# Patient Record
Sex: FEMALE | Race: WHITE | NOT HISPANIC OR LATINO | URBAN - METROPOLITAN AREA
[De-identification: names, ages, dates, MRNs, and addresses within clinical notes are randomized per-mention and may not be internally consistent; named-entity substitution may affect disease eponyms.]

---

## 2017-03-03 ENCOUNTER — ALLSCRIPTS OFFICE VISIT (OUTPATIENT)
Dept: OTHER | Facility: OTHER | Age: 52
End: 2017-03-03

## 2017-03-27 ENCOUNTER — TRANSCRIBE ORDERS (OUTPATIENT)
Dept: ADMINISTRATIVE | Facility: HOSPITAL | Age: 52
End: 2017-03-27

## 2017-03-27 ENCOUNTER — LAB CONVERSION - ENCOUNTER (OUTPATIENT)
Dept: OTHER | Facility: OTHER | Age: 52
End: 2017-03-27

## 2017-03-27 DIAGNOSIS — E04.1 NONTOXIC UNINODULAR GOITER: Primary | ICD-10-CM

## 2017-03-27 LAB
LYME 18 KD IGG (HISTORICAL): REACTIVE
LYME 23 KD IGG (HISTORICAL): REACTIVE
LYME 23 KD IGM (HISTORICAL): ABNORMAL
LYME 28 KD IGG (HISTORICAL): ABNORMAL
LYME 30 KD IGG (HISTORICAL): ABNORMAL
LYME 39 KD IGG (HISTORICAL): ABNORMAL
LYME 39 KD IGM (HISTORICAL): ABNORMAL
LYME 41 KD IGG (HISTORICAL): REACTIVE
LYME 41 KD IGM (HISTORICAL): ABNORMAL
LYME 45 KD IGG (HISTORICAL): REACTIVE
LYME 58 KD IGG (HISTORICAL): REACTIVE
LYME 66 KD IGG (HISTORICAL): ABNORMAL
LYME 93 KD IGG (HISTORICAL): ABNORMAL
LYME IGG (HISTORICAL): POSITIVE
LYME IGM (HISTORICAL): NEGATIVE

## 2017-03-28 ENCOUNTER — GENERIC CONVERSION - ENCOUNTER (OUTPATIENT)
Dept: OTHER | Facility: OTHER | Age: 52
End: 2017-03-28

## 2017-04-04 ENCOUNTER — HOSPITAL ENCOUNTER (OUTPATIENT)
Dept: ULTRASOUND IMAGING | Facility: CLINIC | Age: 52
Discharge: HOME | End: 2017-04-04
Payer: COMMERCIAL

## 2017-04-04 DIAGNOSIS — E04.1 NONTOXIC UNINODULAR GOITER: ICD-10-CM

## 2017-04-04 PROCEDURE — 76536 US EXAM OF HEAD AND NECK: CPT

## 2017-04-06 ENCOUNTER — GENERIC CONVERSION - ENCOUNTER (OUTPATIENT)
Dept: OTHER | Facility: OTHER | Age: 52
End: 2017-04-06

## 2017-05-10 ENCOUNTER — ALLSCRIPTS OFFICE VISIT (OUTPATIENT)
Dept: OTHER | Facility: OTHER | Age: 52
End: 2017-05-10

## 2017-05-11 ENCOUNTER — LAB CONVERSION - ENCOUNTER (OUTPATIENT)
Dept: OTHER | Facility: OTHER | Age: 52
End: 2017-05-11

## 2017-05-11 LAB
LYME 18 KD IGG (HISTORICAL): REACTIVE
LYME 23 KD IGG (HISTORICAL): REACTIVE
LYME 23 KD IGM (HISTORICAL): ABNORMAL
LYME 28 KD IGG (HISTORICAL): ABNORMAL
LYME 30 KD IGG (HISTORICAL): ABNORMAL
LYME 39 KD IGG (HISTORICAL): REACTIVE
LYME 39 KD IGM (HISTORICAL): ABNORMAL
LYME 41 KD IGG (HISTORICAL): REACTIVE
LYME 41 KD IGM (HISTORICAL): ABNORMAL
LYME 45 KD IGG (HISTORICAL): REACTIVE
LYME 58 KD IGG (HISTORICAL): REACTIVE
LYME 66 KD IGG (HISTORICAL): ABNORMAL
LYME 93 KD IGG (HISTORICAL): ABNORMAL
LYME IGG (HISTORICAL): POSITIVE
LYME IGG/IGM AB (HISTORICAL): 10.4 INDEX
LYME IGM (HISTORICAL): NEGATIVE

## 2017-06-08 ENCOUNTER — GENERIC CONVERSION - ENCOUNTER (OUTPATIENT)
Dept: OTHER | Facility: OTHER | Age: 52
End: 2017-06-08

## 2017-06-12 ENCOUNTER — GENERIC CONVERSION - ENCOUNTER (OUTPATIENT)
Dept: OTHER | Facility: OTHER | Age: 52
End: 2017-06-12

## 2017-06-12 ENCOUNTER — LAB CONVERSION - ENCOUNTER (OUTPATIENT)
Dept: OTHER | Facility: OTHER | Age: 52
End: 2017-06-12

## 2017-06-12 LAB
LYME 18 KD IGG (HISTORICAL): REACTIVE
LYME 23 KD IGG (HISTORICAL): REACTIVE
LYME 23 KD IGM (HISTORICAL): ABNORMAL
LYME 28 KD IGG (HISTORICAL): ABNORMAL
LYME 30 KD IGG (HISTORICAL): ABNORMAL
LYME 39 KD IGG (HISTORICAL): REACTIVE
LYME 39 KD IGM (HISTORICAL): ABNORMAL
LYME 41 KD IGG (HISTORICAL): REACTIVE
LYME 41 KD IGM (HISTORICAL): ABNORMAL
LYME 45 KD IGG (HISTORICAL): REACTIVE
LYME 58 KD IGG (HISTORICAL): REACTIVE
LYME 66 KD IGG (HISTORICAL): ABNORMAL
LYME 93 KD IGG (HISTORICAL): ABNORMAL
LYME IGG (HISTORICAL): POSITIVE
LYME IGG/IGM AB (HISTORICAL): 11 INDEX
LYME IGM (HISTORICAL): NEGATIVE

## 2017-06-13 ENCOUNTER — GENERIC CONVERSION - ENCOUNTER (OUTPATIENT)
Dept: OTHER | Facility: OTHER | Age: 52
End: 2017-06-13

## 2017-12-08 PROCEDURE — 87624 HPV HI-RISK TYP POOLED RSLT: CPT | Performed by: OBSTETRICS & GYNECOLOGY

## 2017-12-08 PROCEDURE — G0145 SCR C/V CYTO,THINLAYER,RESCR: HCPCS | Performed by: OBSTETRICS & GYNECOLOGY

## 2017-12-12 ENCOUNTER — LAB REQUISITION (OUTPATIENT)
Dept: LAB | Facility: HOSPITAL | Age: 52
End: 2017-12-12
Payer: COMMERCIAL

## 2017-12-12 DIAGNOSIS — Z01.419 ENCOUNTER FOR GYNECOLOGICAL EXAMINATION WITHOUT ABNORMAL FINDING: ICD-10-CM

## 2017-12-14 LAB
HPV RRNA GENITAL QL NAA+PROBE: NORMAL
LAB AP GYN PRIMARY INTERPRETATION: NORMAL
Lab: NORMAL

## 2017-12-27 DIAGNOSIS — M25.562 PAIN IN LEFT KNEE: ICD-10-CM

## 2017-12-28 ENCOUNTER — GENERIC CONVERSION - ENCOUNTER (OUTPATIENT)
Dept: OTHER | Facility: OTHER | Age: 52
End: 2017-12-28

## 2017-12-28 ENCOUNTER — TRANSCRIBE ORDERS (OUTPATIENT)
Dept: ADMINISTRATIVE | Facility: HOSPITAL | Age: 52
End: 2017-12-28

## 2017-12-28 ENCOUNTER — APPOINTMENT (OUTPATIENT)
Dept: RADIOLOGY | Facility: CLINIC | Age: 52
End: 2017-12-28
Payer: COMMERCIAL

## 2017-12-28 DIAGNOSIS — M25.562 PAIN IN LEFT KNEE: ICD-10-CM

## 2017-12-28 DIAGNOSIS — M25.461 SWELLING OF RIGHT KNEE JOINT: Primary | ICD-10-CM

## 2017-12-28 DIAGNOSIS — M17.5 OTHER SECONDARY OSTEOARTHRITIS OF LEFT KNEE: ICD-10-CM

## 2017-12-28 PROCEDURE — 73564 X-RAY EXAM KNEE 4 OR MORE: CPT

## 2018-01-05 ENCOUNTER — GENERIC CONVERSION - ENCOUNTER (OUTPATIENT)
Dept: OTHER | Facility: OTHER | Age: 53
End: 2018-01-05

## 2018-01-05 ENCOUNTER — HOSPITAL ENCOUNTER (OUTPATIENT)
Dept: MRI IMAGING | Facility: HOSPITAL | Age: 53
Discharge: HOME | End: 2018-01-05
Attending: ORTHOPAEDIC SURGERY
Payer: COMMERCIAL

## 2018-01-05 DIAGNOSIS — M17.5 OTHER SECONDARY OSTEOARTHRITIS OF LEFT KNEE: ICD-10-CM

## 2018-01-05 DIAGNOSIS — M25.461 SWELLING OF RIGHT KNEE JOINT: ICD-10-CM

## 2018-01-05 PROCEDURE — 73721 MRI JNT OF LWR EXTRE W/O DYE: CPT

## 2018-01-09 NOTE — MISCELLANEOUS
Message   Recorded as Task   Date: 09/14/2016 10:24 AM, Created By: Arsenio Ewing   Task Name: Care Coordination   Assigned To: Liv Ortiz   Regarding Patient: Sánchez Disla, Status: In Progress   Comment:    Marisabel Yeager - 14 Sep 2016 10:24 AM     TASK CREATED  pt is on Menest 0 625 and progesterone 100 mg for hot flashes    she is not noticing a difference    she was talking with a pharmacist that was talking about bioidenticals and that [address] [place] [address] has a pharmacy that compounds these    she is interested    Help? Garcia Fragmin - 15 Sep 2016 12:20 PM     TASK REPLIED TO: Previously Assigned To Antonella Hudsonellaadolfo is actually an FDA approved bioidentical hormone, I think she should give it a few more weeks before trying something else, it has not been that long   Marisabel Yeager - 15 Sep 2016 2:44 PM     TASK REASSIGNED: Previously Assigned To Renuka Chase - 16 Sep 2016 9:40 AM     TASK REASSIGNED: Previously Assigned To Liv Ortiz   pt is concerned about the progesterone not being bioindentical and she is worried about the warnings about the sun exposure    she is outside for work    I encouraged her to keep trying this, but       Garcia Fragmin - 18 Sep 2016 8:36 PM     TASK REPLIED TO: Previously Assigned To Garcia Fragmin  she can use provera 2 5mg per day   Marisabel Yeager - 19 Sep 2016 8:07 AM     TASK REPLIED TO: Previously Assigned To MARTIN FREIRE MAX [facility] Nursing,Team  provera is biodentical?  she will ask me   Garcia Fragmin - 20 Sep 2016 10:46 PM     TASK REPLIED TO: Previously Assigned To Garcia Fragmin  no but will give her good control with the bioidentical estrogen   Marisabel Yeager - 21 Sep 2016 8:26 AM     TASK IN PROGRESS   Marisabel Yeager - 21 Sep 2016 8:27 AM     TASK REPLIED TO: Previously Assigned To [address] is the sun exposure   Garcia Fragmin - 22 Sep 2016 1:18 PM     TASK REPLIED TO: Previously Assigned To Garcia Fragmin  she should use the climara patch and the prometrium, it is the best combo and climara is bioidentical   Marisabel Yeager - 23 Sep 2016 12:59 PM     TASK REPLIED TO: Previously Assigned To MARTIN FREIRE MAX [facility] Nursing,Team  Is there any way that you can call her    everytime I call her she challenges me to the core and I feel that we aren't getting anywhere  Christina Aparicio - 23 Sep 2016 2:58 PM     TASK REPLIED TO: Previously Assigned To Clare Michael  I will call her Monday        Active Problems    1  Bloating (787 3) (R14 0)   2  Chronic fatigue (780 79) (R53 82)   3  Encounter for screening mammogram for breast cancer (V76 12) (Z12 31)   4  Flu-like symptoms (780 99) (R68 89)   5  Denied: History of self breast exam   6  Hot flashes, menopausal (627 2) (N95 1)   7  Hyperlipidemia (272 4) (E78 5)   8  Lyme disease (088 81) (A69 20)   9  Pain of right sacroiliac joint (724 6) (M53 3)   10  Pain with urination (788 1) (R30 9)   11  Perimenopause (627 2) (N95 1)   12  Strain of lumbar region, initial encounter (847 2) (S39 012A)   13  Thyroid Nodule   14  Urinary frequency (788 41) (R35 0)   15  Viral infection (079 99) (B34 9)   16  Visit for gynecologic examination (V72 31) (Z01 419)   17  Visit for screening mammogram (V76 12) (Z12 31)   18  Vitamin D deficiency (268 9) (E55 9)    Current Meds   1  Menest 0 625 MG Oral Tablet; TAKE 1 TABLET DAILY; Therapy: 17Vwh4708 to (Cinda Euler)  Requested for: 18Ksl4372; Last   Rx:[id number] Ordered   2  Progesterone Micronized 100 MG Oral Capsule; One daily at bedtime;    Therapy: 15Seo5849 to (Cinda Euler)  Requested for: 79Mcq5501; Last   Rx:25Yur3612 Ordered    Allergies    1  doxycycline    Signatures   Electronically signed by : Tonna Ormond, ; Sep 26 2016  9:14AM EST                       (Author)

## 2018-01-10 ENCOUNTER — GENERIC CONVERSION - ENCOUNTER (OUTPATIENT)
Dept: OTHER | Facility: OTHER | Age: 53
End: 2018-01-10

## 2018-01-10 NOTE — MISCELLANEOUS
Message   Recorded as Task   Date: 06/17/2016 06:24 PM, Created By: Anthony Lara   Task Name: Follow Up   Assigned To: Anthony Lara   Regarding Patient: Juan Pablo Vela, Status: Active   Comment:    Anthony Lara - 17 Jun 2016 6:24 PM     TASK CREATED  Call the patient about her thyroid ultrasound  Yasemin Lau - 22 Jun 2016 6:22 PM     TASK EDITED  I called the patient and reviewed her thyroid ultrasound with her  It demonstrates a small subcentimeter thyroid nodule on the left lobe of the thyroid gland  This is increased very slightly since her last scan in 2011  I advised her that we will plan on repeat repeating a thyroid ultrasound on her again in 6 months  Otherwise, she'll follow-up as scheduled  Results/Data  Results    US THYROID   US THYROID: THYROID ULTRASOUND     INDICATION: Follow-up nodule     COMPARISON: 12/20/2011     TECHNIQUE:   Ultrasound of the thyroid was performed with a high frequency linear  transducer in transverse and sagittal planes including volumetric imaging sweeps as well  as traditional still imaging technique  FINDINGS:   Normal homogeneous smooth echotexture  Right gland:  1 5 x 1 6 x 5 2 cm  No nodules  Left gland:  1 4 x 1 6 x 4 8 cm  Left midpole  0 7 x 0 7 x 0 9 cm  Solid isoechoic nodule  Smooth, well defined margins  No calcifications  Nodule is not taller than it is wide  This nodule has mildly increased in  size from the prior study where it measured 0 3 x 0 5 x 0 5 cm and    appeared more hypoechoic  Isthmus: 0 3 cm in AP dimension  No nodules  IMPRESSION:   There is a left thyroid nodule  While the nodule has mildly increased in size from the  prior study, it remains subcentimeter and without suspicious features, more hyperechoic  than on the previous exam   This does not meet current American Thyroid     Association criteria for requiring biopsy  A shorter interval follow-up is recommended in  12 months       Reference: 2015 American Thyroid Association Management Guidelines for Adult  Patients with Thyroid Nodules and Differentiated Thyroid Cancer  Thyroid, Volume 26,  Number 1, 2016       ##fuslh12##fuslh12       Workstation performed: [id number]     Signed by:   Eusebia Chou MD   6/17/16     Signatures   Electronically signed by : Virginia Josue DO; Jun 22 2016  6:23PM EST                       (Author)

## 2018-01-11 NOTE — MISCELLANEOUS
Message   Recorded as Task   Date: 08/26/2016 01:35 PM, Created By: Martina Keating   Task Name: Medical Complaint Callback   Assigned To: Camryn William   Regarding Patient: Hayden Ewing, Status: Active   CommentCarlos Jaeadalgisa - 26 Aug 2016 1:35 PM     TASK CREATED  Patient called as a follow up to her appointment in [place]  States her menopause sx are much worse  C/o daily hot flashes, insomnia, vaginal dryness  Wants HRT Rx  Particularly interested in vaginal cream, bio identicles  Questions if this can be handled over the phone or if she needs another appointment  Jessica Naval - 29 Aug 2016 2:08 PM     TASK REPLIED TO: Previously Assigned To Jessica Naval  she should come in   Pt informed  Will schedule  Active Problems    1  Bloating (787 3) (R14 0)   2  Chronic fatigue (780 79) (R53 82)   3  Encounter for screening mammogram for breast cancer (V76 12) (Z12 31)   4  Flu-like symptoms (780 99) (R68 89)   5  Denied: History of self breast exam   6  Hyperlipidemia (272 4) (E78 5)   7  Lyme disease (088 81) (A69 20)   8  Pain of right sacroiliac joint (724 6) (M53 3)   9  Pain with urination (788 1) (R30 9)   10  Perimenopause (627 2) (N95 1)   11  Strain of lumbar region, initial encounter (847 2) (S39 012A)   12  Thyroid Nodule   13  Urinary frequency (788 41) (R35 0)   14  Viral infection (079 99) (B34 9)   15  Visit for gynecologic examination (V72 31) (Z01 419)   16  Visit for screening mammogram (V76 12) (Z12 31)   17  Vitamin D deficiency (268 9) (E55 9)    Current Meds   1  Amoxicillin 500 MG Oral Tablet; Take 1 tablet 3 times daily for 10 days, with food;    Therapy: 59RVG1429 to (Evaluate:31Hhp3635)  Requested for: 80Dhs3641; Last   Rx:36Ogp1295 Ordered    Allergies    1  doxycycline    Signatures   Electronically signed by : Gregory Dumont, ; Aug 29 2016  2:35PM EST                       (Author)

## 2018-01-11 NOTE — MISCELLANEOUS
Message   Recorded as Task   Date: 06/12/2017 08:48 AM, Created By: Betzaida Contreras   Task Name: Medical Complaint Callback   Assigned To: La Patel   Regarding Patient: Melani Srinivasan, Status: Active   CommentLevander Border - 12 Jun 2017 8:48 AM     TASK CREATED  Caller: Self; Medical Complaint; [phone] (Home)  PT STATES SHE IS NOT ANY BETTER  SHE FEELS SHE NEEDS TO BE ON ANTIBIOTIC FOR LYME   ALSO CALLING FOR BW RESULTS  SHE CAN BE REACHED [phone]  C/O FATIGUE, SEVERE NECK PRESSURE, DULL ACHE,CRUNCHING NOISE  Yasemin Lau - 12 Jun 2017 12:21 PM     TASK REASSIGNED: Previously Assigned To Aviva Horta  Can call in another week of amoxicillin 500 mg 3 times a day for one week but she needs to see infectious disease at this point  These call [facility] infectious disease to get her an appointment as soon as possible  He can get this number from [place]  06/12/2017 Spoke to patient advised had a positive Lyme titer and needs to see Infectious Disease, given her   Infectious Disease phone number and told her to schedule appt  , and call back I will do referral  Advised [facility] will [facility] [place]  tr[place]      Active Problems    1  Atrophy of vagina (627 3) (N95 2)   2  Chronic fatigue (780 79) (R53 82)   3  Hot flashes, menopausal (627 2) (N95 1)   4  Hyperlipidemia (272 4) (E78 5)   5  Lyme disease (088 81) (A69 20)   6  Pain of right sacroiliac joint (724 6) (M53 3)   7  Perimenopause (627 2) (N95 1)   8  Strain of lumbar region, initial encounter (847 2) (S39 012A)   9  Thyroid nodule (241 0) (E04 1)   10  Vitamin D deficiency (268 9) (E55 9)    Current Meds   1  Amoxicillin 500 MG Oral Capsule; TAKE 1 CAPSULE 3 times daily; Therapy: 49TPN6034 to (Evaluate:71Nmg7985)  Requested for: 88IMS0585; Last   Rx:[id number] Ordered    Allergies    1  doxycycline    Signatures   Electronically signed by :  Ambrocio Iniguez, ; Jun 12 2017  5:06PM EST (Author)

## 2018-01-12 NOTE — MISCELLANEOUS
Message   Date: 31 May 2016 2:55 PM EST, Recorded By: Naif Messer For: Umberto Velasquez   Caller: Italia Carwford, Self   Phone: [phone] (Home)   Reason: Other   reviewed results with pt    pt had neg UA, take NSAIDS for discomfort, U/S showed small fibroidsand she needs to repeat in 4-6 months   she is to see her fam dr if no relief    Active Problems    1  Bloating (787 3) (R14 0)   2  Denied: History of self breast exam   3  Hyperlipidemia (272 4) (E78 5)   4  Pain with urination (788 1) (R30 9)   5  Urinary frequency (788 41) (R35 0)   6  Visit for gynecologic examination (V72 31) (Z01 419)   7  Visit for screening mammogram (V76 12) (Z12 31)   8  Vitamin D deficiency (268 9) (E55 9)    Current Meds   1  No Reported Medications Recorded    Allergies    1   No Known Drug Allergies    Signatures   Electronically signed by : Melvi Bass, ; May 31 2016  2:58PM EST                       (Author)

## 2018-01-13 VITALS
DIASTOLIC BLOOD PRESSURE: 90 MMHG | BODY MASS INDEX: 33.67 KG/M2 | SYSTOLIC BLOOD PRESSURE: 120 MMHG | HEART RATE: 68 BPM | WEIGHT: 171.5 LBS | TEMPERATURE: 98.7 F | HEIGHT: 60 IN

## 2018-01-14 VITALS
HEIGHT: 60 IN | SYSTOLIC BLOOD PRESSURE: 130 MMHG | WEIGHT: 170 LBS | DIASTOLIC BLOOD PRESSURE: 84 MMHG | TEMPERATURE: 99.6 F | HEART RATE: 68 BPM | BODY MASS INDEX: 33.38 KG/M2

## 2018-01-15 NOTE — MISCELLANEOUS
Message   Recorded as Task   Date: 04/06/2017 05:06 PM, Created By: Arsenio No   Task Name: Go to Result   Assigned To: Elvia Loaiza   Regarding Patient: Vishal Luna, Status: Active   Comment:    Yasemin Lau - 06 Apr 2017 5:06 PM     TASK CREATED  Please let the patient know that her thyroid ultrasound demonstrates that there were no new nodules and everything looks stable  She still has that very tiny nodule -she had on previous scan but has not changed at all  We will plan a rechecking a thyroid ultrasound in one year  Elvia Loaiza - 06 Apr 2017 5:18 PM     TASK EDITED  Patient advised  PLM        Active Problems    1  Atrophy of vagina (627 3) (N95 2)   2  Chronic fatigue (780 79) (R53 82)   3  Hot flashes, menopausal (627 2) (N95 1)   4  Hyperlipidemia (272 4) (E78 5)   5  Lyme disease (088 81) (A69 20)   6  Pain of right sacroiliac joint (724 6) (M53 3)   7  Perimenopause (627 2) (N95 1)   8  Strain of lumbar region, initial encounter (847 2) (S39 012A)   9  Thyroid nodule (241 0) (E04 1)   10  Tick bite, initial encounter (919 4,E906 4) (W57 XXXA)   11  Vitamin D deficiency (268 9) (E55 9)    Current Meds   1  Cefuroxime Axetil 500 MG Oral Tablet; TAKE 1 TABLET TWICE DAILY;    Therapy: 35RWQ6682 to (Evaluate:65Cwf2191)  Requested for: 28Mar2017; Last   Rx:[id number] Ordered    Allergies    1  doxycycline    Signatures   Electronically signed by : Андрей Husain, ; Apr 6 2017  5:19PM EST                       (Author)

## 2018-01-16 NOTE — MISCELLANEOUS
Message   Recorded as Task   Date: 06/13/2017 10:43 AM, Created By: Zena Gore   Task Name: Care Coordination   Assigned To: Jerome Schulte   Regarding Patient: Alfonso Reynaga, Status: Active   Comment:    Zena Gore - 13 Jun 2017 10:43 AM     TASK CREATED  Caller: Self; Care Coordination; [phone] (Home); [phone] (Mobile Phone)    Patient wants a call back from you  she wants to know why she is getting 1 week of antibtioic this time instead of 3 weeks like the last course  also, she is very unhappy about the wait time for infectious disease (she said July is the earliest)   patient was advised you would be seeing patients until late this evening but the message would be passed on  Yasemin Lau - 13 Jun 2017 9:02 PM     TASK EDITED  I spoke with the patient  She states that she did have improvement with the last round of amoxicillin but since stopping and her symptoms have been gradually returning  She said she is diffuse arthralgias and neck pain area did there are no fevers  There are no obvious rashes  She feels very tired and rundown  I advised her the symptoms may or may not be related to Lyme  The patient is insistent that she feels that it is her Lyme  I advised her of her Lyme titer that it's still positive which is not surprising being she had recent exposure  As a precaution we will continue her on the amoxicillin for full 3 weeks and we will help to arrange to get her sooner appointment with infectious disease for follow-up  She is agreeable to this and we'll follow-up as needed          Plan  Lyme disease    · Amoxicillin 500 MG Oral Capsule; TAKE 1 CAPSULE 3 times daily    Results/Data     (Q) LYME DISEASE AB, TOTAL W/REFL WB (IGG, IGM)   LYME AB SCREEN: 11 00 index Abnormal High  LYME DISEASE AB (IGG) WB: POSITIVE  Abnormal Reference Range NEGATIVE  18 KD (IGG) BAND: REACTIVE  Abnormal  23 KD (IGG) BAND: REACTIVE  Abnormal  28 KD (IGG) BAND: NON-REACTIVE   30 KD (IGG) BAND: NON-REACTIVE   39 KD (IGG) BAND: REACTIVE  Abnormal  41 KD (IGG) BAND: REACTIVE  Abnormal  45 KD (IGG) BAND: REACTIVE  Abnormal  58 KD (IGG) BAND: REACTIVE  Abnormal  66 KD (IGG) BAND: NON-REACTIVE   93 KD (IGG) BAND: NON-REACTIVE   LYME DISEASE AB (IGM) WB: NEGATIVE  Reference Range NEGATIVE  23 KD (IGM) BAND: NON-REACTIVE   39 KD (IGM) BAND: NON-REACTIVE   41 KD (IGM) BAND: NON-REACTIVE     Signatures   Electronically signed by : Sandhya Crawley DO; Jun 13 2017  9:03PM EST                       (Author)

## 2018-01-16 NOTE — MISCELLANEOUS
Message   Recorded as Task   Date: 06/07/2017 08:48 AM, Created By: Army Charles   Task Name: Call Back   Assigned To: Elvia Loaiza   Regarding Patient: Melani Srinivasan, Status: In Progress   Myles Fairbanks - 07 Jun 2017 8:48 AM     TASK CREATED  Caller: Self; Other; [phone] (Home)  PT SEEN YOU FOR LYME DISEASE AND SHE IS STILL HAVING SYMPTONS AND WOULD LIKE YOU TO ORDER A BLOODTEST FOR HER FOR THE LYME DISEASE CONTACT HER [phone] WHEN ORDER IS READY FOR    Ethan Guzman - 07 Jun 2017 1:06 PM     TASK REASSIGNED: Previously Assigned To Deshawnsera Thomas  It is okay to give her an order for a Lyme titre  I would also recomend a consultation with [facility]'s ID- she can call them- Fabioaugustina Segovia has the number  Elvia Loaiza - 07 Jun 2017 2:07 PM     TASK EDITED  Left message -- call office  PLM   Elvia Loaiza - 07 Jun 2017 2:07 PM     TASK IN PROGRESS   Elvia Loaiza - 08 Jun 2017 11:32 AM     TASK EDITED  [facility] # given to  Loly Terrell  PLM        Active Problems    1  Atrophy of vagina (627 3) (N95 2)   2  Chronic fatigue (780 79) (R53 82)   3  Hot flashes, menopausal (627 2) (N95 1)   4  Hyperlipidemia (272 4) (E78 5)   5  Lyme disease (088 81) (A69 20)   6  Pain of right sacroiliac joint (724 6) (M53 3)   7  Perimenopause (627 2) (N95 1)   8  Strain of lumbar region, initial encounter (847 2) (S39 012A)   9  Thyroid nodule (241 0) (E04 1)   10  Vitamin D deficiency (268 9) (E55 9)    Current Meds   1  Amoxicillin 500 MG Oral Capsule; TAKE 1 CAPSULE 3 times daily;    Therapy: 48QGF8164 to (Evaluate:17Ycn0079)  Requested for: 40HSN3764; Last   Rx:[id number] Ordered    Allergies    1  doxycycline    Signatures   Electronically signed by : Maury Escoto, ; Jun 8 2017 11:33AM EST                       (Author)

## 2018-01-16 NOTE — MISCELLANEOUS
Message   Date: 24 May 2016 4:17 PM EST, Recorded By: Rashid Hunter For: Aleks Media: Leonid Stable, Self   Phone: [phone] (Home)   Reason: Medical Complaint   Patient called c/o pelvic pressure, discomfort, bloating x 1 week  Feels uncomfortable  Feels like she is retaining fluids and gaining weight  Skipped menses x 6 months  Then got MP on 5/2/16  Wants appointment  Active Problems    1  Denied: History of self breast exam   2  Hyperlipidemia (272 4) (E78 5)   3  Visit for gynecologic examination (V72 31) (Z01 419)   4  Visit for screening mammogram (V76 12) (Z12 31)   5  Vitamin D deficiency (268 9) (E55 9)    Current Meds   1  No Reported Medications Recorded    Allergies    1   No Known Drug Allergies    Signatures   Electronically signed by : Piedad Etienne, ; May 24 2016  4:20PM EST                       (Author)

## 2018-01-16 NOTE — MISCELLANEOUS
Message   Recorded as Task   Date: 06/22/2016 06:23 PM, Created By: Brianna Ramsey   Task Name: Follow Up   Assigned To: Elvia Loaiza   Regarding Patient: Tanvi Kelley, Status: Active   Comment:    LauAmadora - 22 Jun 2016 6:23 PM     TASK CREATED  Repeat the thyroid ultrasound  Yasemin Lau - 14 Dec 2016 8:20 AM     TASK REASSIGNED: Previously Assigned To Brianna Ramsey  Please advise the patient that she is due for a repeat thyroid US to follow up on the thyroid ndule  Elvia Loaiza - 14 Dec 2016 8:16 PM     TASK EDITED  Patient advised  SL requisition completed  PLM        Active Problems    1  Bloating (787 3) (R14 0)   2  Chronic fatigue (780 79) (R53 82)   3  Encounter for screening mammogram for breast cancer (V76 12) (Z12 31)   4  Flu-like symptoms (780 99) (R68 89)   5  Denied: History of self breast exam   6  Hot flashes, menopausal (627 2) (N95 1)   7  Hyperlipidemia (272 4) (E78 5)   8  Lyme disease (088 81) (A69 20)   9  Pain of right sacroiliac joint (724 6) (M53 3)   10  Pain with urination (788 1) (R30 9)   11  Perimenopause (627 2) (N95 1)   12  Strain of lumbar region, initial encounter (847 2) (S39 012A)   13  Thyroid nodule (241 0) (E04 1)   14  Urinary frequency (788 41) (R35 0)   15  Viral infection (079 99) (B34 9)   16  Visit for gynecologic examination (V72 31) (Z01 419)   17  Visit for screening mammogram (V76 12) (Z12 31)   18  Vitamin D deficiency (268 9) (E55 9)    Current Meds   1  Menest 0 625 MG Oral Tablet; TAKE 1 TABLET DAILY; Therapy: 16Tvu5148 to (Mojgan Guy)  Requested for: 12Gob5268; Last   Rx:[id number] Ordered   2  Progesterone Micronized 100 MG Oral Capsule; One daily at bedtime;    Therapy: 86Isy7390 to (Mojgan Guy)  Requested for: 51Hog0683; Last   Rx:[id number] Ordered    Allergies    1  doxycycline    Signatures   Electronically signed by : Luciano Park, ; Dec 14 2016  8:16PM EST                       (Author)

## 2018-01-16 NOTE — MISCELLANEOUS
Message  I spoke with the patient  I advised her her Lyme titer was positive  We will treat her with the Ceftin as ordered  She'll follow-up as needed        Plan  Lyme disease, Tick bite, initial encounter    · Cefuroxime Axetil 500 MG Oral Tablet; TAKE 1 TABLET TWICE DAILY    Results/Data     (Q) LYME DISEASE ANTIBODIES (IGG, IGM) WESTERN BLOT   LYME DISEASE AB (IGG) WB: POSITIVE  Abnormal Reference Range NEGATIVE  18 KD (IGG) BAND: REACTIVE  Abnormal  23 KD (IGG) BAND: REACTIVE  Abnormal  28 KD (IGG) BAND: NON-REACTIVE   30 KD (IGG) BAND: NON-REACTIVE   39 KD (IGG) BAND: NON-REACTIVE   41 KD (IGG) BAND: REACTIVE  Abnormal  45 KD (IGG) BAND: REACTIVE  Abnormal  58 KD (IGG) BAND: REACTIVE  Abnormal  66 KD (IGG) BAND: NON-REACTIVE   93 KD (IGG) BAND: NON-REACTIVE   LYME DISEASE AB (IGM) WB: NEGATIVE  Reference Range NEGATIVE  23 KD (IGM) BAND: NON-REACTIVE   39 KD (IGM) BAND: NON-REACTIVE   41 KD (IGM) BAND: NON-REACTIVE     Signatures   Electronically signed by : Stephan Sevilla DO; Mar 28 2017  9:55PM EST                       (Author)

## 2018-01-24 VITALS
HEART RATE: 72 BPM | SYSTOLIC BLOOD PRESSURE: 132 MMHG | BODY MASS INDEX: 35.73 KG/M2 | HEIGHT: 60 IN | WEIGHT: 182 LBS | DIASTOLIC BLOOD PRESSURE: 84 MMHG

## 2018-01-24 VITALS
SYSTOLIC BLOOD PRESSURE: 130 MMHG | HEIGHT: 60 IN | DIASTOLIC BLOOD PRESSURE: 80 MMHG | WEIGHT: 181 LBS | BODY MASS INDEX: 35.53 KG/M2 | HEART RATE: 80 BPM

## 2018-12-07 ENCOUNTER — TELEPHONE (OUTPATIENT)
Dept: GYNECOLOGY | Facility: CLINIC | Age: 53
End: 2018-12-07

## 2018-12-07 NOTE — TELEPHONE ENCOUNTER
Hue Gentile would like to know if she should get any BW to check her HRT levels prior to her annual appointment which is scheduled for 12/14/18  If you would like to place orders, I will send to pt

## 2018-12-12 RX ORDER — ESTRADIOL 1 MG/1
TABLET ORAL
COMMUNITY
Start: 2017-12-08 | End: 2018-12-14 | Stop reason: SDUPTHER

## 2018-12-12 RX ORDER — MELATONIN
COMMUNITY
Start: 2017-12-08

## 2018-12-14 ENCOUNTER — ANNUAL EXAM (OUTPATIENT)
Dept: GYNECOLOGY | Facility: CLINIC | Age: 53
End: 2018-12-14
Payer: COMMERCIAL

## 2018-12-14 VITALS
HEART RATE: 72 BPM | DIASTOLIC BLOOD PRESSURE: 78 MMHG | BODY MASS INDEX: 33.42 KG/M2 | SYSTOLIC BLOOD PRESSURE: 112 MMHG | RESPIRATION RATE: 15 BRPM | WEIGHT: 177 LBS | HEIGHT: 61 IN

## 2018-12-14 DIAGNOSIS — R63.5 WEIGHT GAIN: ICD-10-CM

## 2018-12-14 DIAGNOSIS — D25.1 FIBROIDS, INTRAMURAL: ICD-10-CM

## 2018-12-14 DIAGNOSIS — N95.9 MENOPAUSAL DISORDER: ICD-10-CM

## 2018-12-14 DIAGNOSIS — Z01.419 WOMEN'S ANNUAL ROUTINE GYNECOLOGICAL EXAMINATION: Primary | ICD-10-CM

## 2018-12-14 DIAGNOSIS — Z12.31 ENCOUNTER FOR SCREENING MAMMOGRAM FOR MALIGNANT NEOPLASM OF BREAST: ICD-10-CM

## 2018-12-14 DIAGNOSIS — N95.2 POSTMENOPAUSAL ATROPHIC VAGINITIS: ICD-10-CM

## 2018-12-14 PROCEDURE — 99396 PREV VISIT EST AGE 40-64: CPT | Performed by: OBSTETRICS & GYNECOLOGY

## 2018-12-14 RX ORDER — ESTRADIOL 1 MG/1
1 TABLET ORAL DAILY
Qty: 90 TABLET | Refills: 4 | Status: SHIPPED | OUTPATIENT
Start: 2018-12-14

## 2018-12-14 RX ORDER — NABUMETONE 750 MG/1
750 TABLET, FILM COATED ORAL 2 TIMES DAILY WITH MEALS
Refills: 0 | COMMUNITY
Start: 2018-11-23 | End: 2018-12-14

## 2018-12-14 RX ORDER — METHOCARBAMOL 750 MG/1
TABLET, FILM COATED ORAL
Refills: 0 | COMMUNITY
Start: 2018-11-24 | End: 2018-12-14

## 2018-12-14 RX ORDER — OXYCODONE HYDROCHLORIDE AND ACETAMINOPHEN 5; 325 MG/1; MG/1
TABLET ORAL
Refills: 0 | COMMUNITY
Start: 2018-11-24 | End: 2018-12-14

## 2018-12-14 RX ORDER — ESTRADIOL 10 UG/1
1 INSERT VAGINAL 2 TIMES WEEKLY
Qty: 24 TABLET | Refills: 4 | Status: SHIPPED | OUTPATIENT
Start: 2018-12-17

## 2018-12-14 RX ORDER — LIDOCAINE 50 MG/G
PATCH TOPICAL
Refills: 0 | COMMUNITY
Start: 2018-11-24 | End: 2018-12-14

## 2018-12-14 NOTE — PROGRESS NOTES
Assessment/Plan:       Diagnoses and all orders for this visit:    Women's annual routine gynecological examination    Encounter for screening mammogram for malignant neoplasm of breast  -     Mammo screening bilateral w cad; Future    Weight gain  -     TSH, 3rd generation with Free T4 reflex; Future  -     Thyroid Peroxidase and Thyroglobulin Antibodies; Future    Fibroids, intramural  -     US pelvis complete w transvaginal; Future    Postmenopausal atrophic vaginitis  -     estradiol (YUVAFEM) 10 MCG TABS vaginal tablet; Insert 1 tablet (10 mcg total) into the vagina 2 (two) times a week    Menopausal disorder  -     estradiol (ESTRACE) 1 mg tablet; Take 1 tablet (1 mg total) by mouth daily  -     progesterone (PROMETRIUM) 200 MG capsule; Take 1 capsule (200 mg total) by mouth daily at bedtime    Other orders  -     Discontinue: estradiol (ESTRACE) 1 mg tablet; take 1 tablet by oral route  every day  -     cholecalciferol (VITAMIN D3) 1,000 units tablet; qd  -     Discontinue: lidocaine (LIDODERM) 5 %; APPLY 1 PATCH ON THE SKIN DAILY FOR 7 DAYS  REMOVE AND DISCARD PA     (REFER TO PRESCRIPTION NOTES)  -     Discontinue: methocarbamol (ROBAXIN) 750 mg tablet; take 1 tablet by mouth four times a day if needed for muscle spasm  -     Discontinue: nabumetone (RELAFEN) 750 mg tablet; Take 750 mg by mouth 2 (two) times a day with meals  -     Discontinue: oxyCODONE-acetaminophen (PERCOCET) 5-325 mg per tablet; TAKE 1 TABLET EVERY 6 HOURS AS NEEDED FOR MODERATE TO SEVERE PAIN     (REFER TO PRESCRIPTION NOTES)  -     Discontinue: progesterone (PROMETRIUM) 200 MG capsule;           Subjective:      Patient ID: Candis Douglas is a 46 y o  female  The patient is a 71-year-old who presents for an annual gyn exam   Last year at her visit she complained of severe hot flashes  She was started on estradiol and Prometrium orally  She states that this has eliminated her hot flashes    She has not noticed any effect on her joints  She notes that she has gained weight in the past year  She would like to continue the hormone replacement  She denies any vaginal bleeding or breast problems  The patient states that she has had urinary frequency for years  She states that she probably urinates every single hour  She does not feel that she is drinking excessively  It is no worse than it was years ago  She does not really consider this a problem, it is just an observation  She has no incontinence  We discussed referral to a urogynecologist to determine if she is having spasms of the bladder or a small capacity bladder  She is not interested in doing this at this time  We also discussed that vaginal estrogen may help with the frequency and she will try this  The patient is not sexually active  Her relationship with her  is not good  She reports that she has no sex drive at all  This has changed in the last couple of years  We discussed that if she wanted, she could switch from estradiol to an Estratest formula to help with her libido  She is hesitant to do this since she already feels that she is getting some facial hair and her hair is thinning on her head  She had not had any recent thyroid testing, but in the past her peroxidase or thyroid drop globulin antibody level was elevated  She would like to have these retested  The following portions of the patient's history were reviewed and updated as appropriate: allergies, current medications, past family history, past medical history, past social history, past surgical history and problem list     Review of Systems   Constitutional: Negative  Respiratory: Negative for shortness of breath  Cardiovascular: Negative for chest pain  Gastrointestinal: Negative  Genitourinary: Positive for frequency  Skin: Negative  Psychiatric/Behavioral: Negative for agitation, behavioral problems and confusion           Objective:      /78 (Cuff Size: Adult)   Pulse 72   Resp 15   Ht 5' 1" (1 549 m)   Wt 80 3 kg (177 lb)   BMI 33 44 kg/m²          Physical Exam   Constitutional: She appears well-developed and well-nourished  No distress  HENT:   Head: Normocephalic  Pulmonary/Chest: Effort normal  No respiratory distress  Right breast exhibits no inverted nipple, no mass, no nipple discharge, no skin change and no tenderness  Left breast exhibits no inverted nipple, no mass, no nipple discharge, no skin change and no tenderness  Breasts are symmetrical    Abdominal: She exhibits no distension and no mass  There is no tenderness  There is no rebound and no guarding  Genitourinary: Rectum normal  No labial fusion  There is no rash, tenderness, lesion or injury on the right labia  There is no rash, tenderness, lesion or injury on the left labia  Uterus is enlarged  Uterus is not deviated, not fixed and not tender  Cervix exhibits no motion tenderness, no discharge and no friability  Right adnexum displays no mass, no tenderness and no fullness  Left adnexum displays no mass, no tenderness and no fullness  No erythema, tenderness or bleeding in the vagina  No foreign body in the vagina  No signs of injury around the vagina  No vaginal discharge found  Genitourinary Comments: Atrophic vaginal changes are present  The uterus is globular and about 10 weeks size  She had an enlarged uterus last year  Ultrasound of the pelvis had shown a fibroid  She will get another ultrasound to follow-up on the size of this  Lymphadenopathy:        Right axillary: No pectoral and no lateral adenopathy present  Left axillary: No pectoral and no lateral adenopathy present  Skin: Skin is warm, dry and intact  She is not diaphoretic  No cyanosis  Nails show no clubbing  Psychiatric: She has a normal mood and affect   Her speech is normal and behavior is normal

## 2018-12-18 LAB
25(OH)D3 SERPL-MCNC: 51 NG/ML (ref 30–100)
THYROGLOB AB SERPL-ACNC: 1 IU/ML
THYROPEROXIDASE AB SERPL-ACNC: 2 IU/ML
TSH SERPL-ACNC: 2.09 MIU/L

## 2018-12-20 ENCOUNTER — TELEPHONE (OUTPATIENT)
Dept: GYNECOLOGY | Facility: CLINIC | Age: 53
End: 2018-12-20

## 2018-12-20 NOTE — TELEPHONE ENCOUNTER
----- Message from Teena Young MD sent at 12/20/2018 11:44 AM EST -----  Notify pt that her thyroid studies are all normal

## 2018-12-20 NOTE — TELEPHONE ENCOUNTER
----- Message from Tatiana Pickard MD sent at 12/20/2018 11:45 AM EST -----  Notify pt that her Vit D level is normal also

## 2018-12-21 ENCOUNTER — TELEPHONE (OUTPATIENT)
Dept: GYNECOLOGY | Facility: CLINIC | Age: 53
End: 2018-12-21

## 2018-12-21 NOTE — TELEPHONE ENCOUNTER
[facility] called asking for Mohit's u/s scripts to be faxed to [phone]  Printed and faxed at 2:15pm  Confirmation page scanned into chart

## 2019-01-09 ENCOUNTER — TELEPHONE (OUTPATIENT)
Dept: GYNECOLOGY | Facility: CLINIC | Age: 54
End: 2019-01-09

## 2019-01-09 NOTE — TELEPHONE ENCOUNTER
Pt called stating that she has called several times and this will be the last time that she is going call for test results  I have tried to call pt back several times and I am not getting any response (no VM)   I have taken notice that Daniel Leon also has tried several times and finally sent her a letter about her test results

## 2019-01-16 NOTE — TELEPHONE ENCOUNTER
Send another letter and confirm that we have called her several times with no answer  She may need to provide us with an updated phone number    Also tell her the thyroid tests are normal

## 2019-02-08 ENCOUNTER — TELEPHONE (OUTPATIENT)
Dept: FAMILY MEDICINE CLINIC | Facility: CLINIC | Age: 54
End: 2019-02-08

## 2019-02-08 ENCOUNTER — TELEPHONE (OUTPATIENT)
Dept: GYNECOLOGY | Facility: CLINIC | Age: 54
End: 2019-02-08

## 2019-02-08 NOTE — TELEPHONE ENCOUNTER
Pt called about her pelvic us results they are in [facility] care everyway dated on 01/11/19 could you pls take a look at this and I told pt I will call her back with the results

## 2019-02-08 NOTE — TELEPHONE ENCOUNTER
Spoke with patient by phone  The mammo in Epic is from Jan of LAST year  Yobany Mcgrath called [facility] at [facility] and they have no record of the patient being there in the past year  She will go to the facility and check the name and get their phone number, Perhaps is was a [id number] or other independent imaging center  Pt agrees

## 2019-02-08 NOTE — TELEPHONE ENCOUNTER
Pt called back again to check on results  I explained you have had a busy afternoon but hopes for a call soon

## 2019-02-11 ENCOUNTER — TELEPHONE (OUTPATIENT)
Dept: FAMILY MEDICINE CLINIC | Facility: CLINIC | Age: 54
End: 2019-02-11

## 2019-02-11 NOTE — TELEPHONE ENCOUNTER
Patient called and wanted to know if Dr Ned Montoya received the test results    Please call her to advise [phone]

## 2019-02-14 ENCOUNTER — TELEPHONE (OUTPATIENT)
Dept: FAMILY MEDICINE CLINIC | Facility: CLINIC | Age: 54
End: 2019-02-14

## 2019-02-14 NOTE — TELEPHONE ENCOUNTER
Pt called and asked if  You could please call [phone] and obtain her u/s report   Pt states she has call multiply times

## 2019-02-15 NOTE — TELEPHONE ENCOUNTER
The u/s of the pelvis show that the fibroid is stable in size, but she now has a second, smaller fibroid  That is most likely due to the hormone replacement  We will keep an eye on that with another u/s in 6 months  The endometrium is slightly thickened for a postmenopausal woman  This also may be related to the hormones, but she should have an EMB to be sure  She should premedicate with 800 mg ibuprofen, if she can take it  The right ovary is normal    The left ovary was not identified

## 2019-02-18 ENCOUNTER — TELEPHONE (OUTPATIENT)
Dept: GYNECOLOGY | Facility: CLINIC | Age: 54
End: 2019-02-18

## 2019-02-18 NOTE — TELEPHONE ENCOUNTER
Called patient to explain that the u/s of the pelvis show that the fibroid is stable in size, but she now has a second, smaller fibroid  That is most likely due to the hormone replacement  We will keep an eye on that with another u/s in 6 months  The endometrium is slightly thickened for a postmenopausal woman  This also may be related to the hormones, but she should have an EMB  Per patient, she will call back to schedule EMB and u/s order mailed to patient